# Patient Record
Sex: FEMALE | Race: OTHER | NOT HISPANIC OR LATINO
[De-identification: names, ages, dates, MRNs, and addresses within clinical notes are randomized per-mention and may not be internally consistent; named-entity substitution may affect disease eponyms.]

---

## 2021-08-20 ENCOUNTER — TRANSCRIPTION ENCOUNTER (OUTPATIENT)
Age: 36
End: 2021-08-20

## 2021-08-22 ENCOUNTER — TRANSCRIPTION ENCOUNTER (OUTPATIENT)
Age: 36
End: 2021-08-22

## 2021-08-27 ENCOUNTER — TRANSCRIPTION ENCOUNTER (OUTPATIENT)
Age: 36
End: 2021-08-27

## 2022-01-11 ENCOUNTER — RESULT REVIEW (OUTPATIENT)
Age: 37
End: 2022-01-11

## 2023-04-27 ENCOUNTER — APPOINTMENT (OUTPATIENT)
Dept: DERMATOLOGY | Facility: CLINIC | Age: 38
End: 2023-04-27
Payer: COMMERCIAL

## 2023-04-27 VITALS — HEIGHT: 65 IN | WEIGHT: 175 LBS | BODY MASS INDEX: 29.16 KG/M2

## 2023-04-27 VITALS — BODY MASS INDEX: 29.32 KG/M2 | WEIGHT: 176 LBS | HEIGHT: 65 IN

## 2023-04-27 DIAGNOSIS — D36.10 BENIGN NEOPLASM OF PERIPHERAL NERVES AND AUTONOMIC NERVOUS SYSTEM, UNSPECIFIED: ICD-10-CM

## 2023-04-27 DIAGNOSIS — B07.9 VIRAL WART, UNSPECIFIED: ICD-10-CM

## 2023-04-27 DIAGNOSIS — K64.4 RESIDUAL HEMORRHOIDAL SKIN TAGS: ICD-10-CM

## 2023-04-27 PROBLEM — Z00.00 ENCOUNTER FOR PREVENTIVE HEALTH EXAMINATION: Status: ACTIVE | Noted: 2023-04-27

## 2023-04-27 PROCEDURE — 99203 OFFICE O/P NEW LOW 30 MIN: CPT | Mod: 25

## 2023-04-27 PROCEDURE — 17110 DESTRUCTION B9 LES UP TO 14: CPT

## 2023-04-27 NOTE — PHYSICAL EXAM
[Alert] : alert [Oriented x 3] : ~L oriented x 3 [FreeTextEntry3] : Focused exam performed. Findings notable for:\par \par Pedunculated skin toned papule on perianal region\par Soft fleshy depressible nodule on R posterior upper thigh\par Verrucous papule on R3 subungual region (fingernails with opaque polish)

## 2023-04-27 NOTE — ASSESSMENT
[FreeTextEntry1] : # Perianal skin tag\par - Benign, reassured\par - Treated with LN2 today (courtesy, no charge)\par - Discussed that future treatment would incur cosmetic fee (120)\par - No s/s of underlying IBD\par \par # Neurofibroma \par - Benign, reassured\par - Given symptoms, discussed excision\par - Reviewed R/B/E and pre op instructions\par - Pt will schedule if desired; cpt codes 38060 and [id number]\par \par # NUB - R3 subungual\par - Favor VV\par - LN2 performed per procedure note\par - Return if not resolved, may require biopsy\par \par The risks/benefits/alternatives of cryo-destruction was explained to the patient which, include but are not limited to redness, swelling, pain, blistering, scar, discoloration of skin, and recurrence. The patient expressed understanding of these risks and agreed to the procedure. # 2 lesions treated with 2 cycle of LN2. The procedure was well tolerated, without complication. We have discussed related skin care.\par

## 2023-04-27 NOTE — HISTORY OF PRESENT ILLNESS
[FreeTextEntry1] : Skin lesion - NPA [de-identified] : # Skin lesions\par On perianal region - feels that it is growing. No hx of IBD. No diarrhea, blood in stool.\par On R posterior thigh - becomes irritated\par On R3 finger - tender growth under nail

## 2023-08-16 ENCOUNTER — APPOINTMENT (OUTPATIENT)
Dept: DERMATOLOGY | Facility: CLINIC | Age: 38
End: 2023-08-16
Payer: COMMERCIAL

## 2023-08-16 PROCEDURE — 12032 INTMD RPR S/A/T/EXT 2.6-7.5: CPT

## 2023-08-16 PROCEDURE — 11402 EXC TR-EXT B9+MARG 1.1-2 CM: CPT

## 2023-08-16 NOTE — PROCEDURE
[___ cm] : [unfilled] cm [___ ml of 1% lidocaine w/ 1:100,000 epinephrine] : [unfilled] ml of 1% lidocaine with 1:100,000 epinephrine [Electrodessication] : electrodessication [4-0] : 4-0 Prolene [____ cm] : [unfilled] cm [____ days] : [unfilled] days [FreeTextEntry1] : Marla Hines MD [FreeTextEntry7] : R posterior thigh [TWNoteComboBox3] : Subcutaneous fat [TWNoteComboBox5] : Subcutaneous fat

## 2023-08-30 ENCOUNTER — APPOINTMENT (OUTPATIENT)
Dept: DERMATOLOGY | Facility: CLINIC | Age: 38
End: 2023-08-30
Payer: COMMERCIAL

## 2023-08-30 DIAGNOSIS — Z48.02 ENCOUNTER FOR REMOVAL OF SUTURES: ICD-10-CM

## 2023-08-30 PROCEDURE — 99024 POSTOP FOLLOW-UP VISIT: CPT

## 2023-08-30 NOTE — HISTORY OF PRESENT ILLNESS
[FreeTextEntry1] : Suture removal [de-identified] : Here today for suture removal Path c/w fibrolipoma No issues

## 2023-08-30 NOTE — ASSESSMENT
[FreeTextEntry1] : # Fibrolipoma s/p excision - Reviewed benign path - Sutures removed - Scar care sheet provided  FU PRN

## 2024-05-14 ENCOUNTER — APPOINTMENT (OUTPATIENT)
Dept: DERMATOLOGY | Facility: CLINIC | Age: 39
End: 2024-05-14

## 2024-05-28 ENCOUNTER — APPOINTMENT (OUTPATIENT)
Dept: PLASTIC SURGERY | Facility: CLINIC | Age: 39
End: 2024-05-28
Payer: COMMERCIAL

## 2024-05-28 VITALS
SYSTOLIC BLOOD PRESSURE: 118 MMHG | WEIGHT: 168 LBS | HEART RATE: 65 BPM | HEIGHT: 65 IN | BODY MASS INDEX: 27.99 KG/M2 | OXYGEN SATURATION: 99 % | DIASTOLIC BLOOD PRESSURE: 77 MMHG

## 2024-05-28 DIAGNOSIS — N64.81 PTOSIS OF BREAST: ICD-10-CM

## 2024-05-28 DIAGNOSIS — N62 HYPERTROPHY OF BREAST: ICD-10-CM

## 2024-05-28 PROCEDURE — 99204 OFFICE O/P NEW MOD 45 MIN: CPT

## 2024-05-28 RX ORDER — LOSARTAN POTASSIUM 25 MG/1
25 TABLET, FILM COATED ORAL
Refills: 0 | Status: ACTIVE | COMMUNITY

## 2024-05-28 RX ORDER — MUPIROCIN 20 MG/G
2 OINTMENT TOPICAL
Qty: 1 | Refills: 0 | Status: COMPLETED | COMMUNITY
Start: 2023-08-16 | End: 2024-05-28

## 2024-05-31 PROBLEM — N62 BREAST HYPERTROPHY: Status: ACTIVE | Noted: 2024-05-31

## 2024-05-31 PROBLEM — N64.81 PTOSIS OF BREAST: Status: ACTIVE | Noted: 2024-05-31

## 2024-05-31 NOTE — REVIEW OF SYSTEMS
[Shoulder Problem] : shoulder problems [Shoulder Pain] : shoulder pain [Mid Back Pain] : mid back pain [Upper Back Pain] : upper back pain

## 2024-05-31 NOTE — PHYSICAL EXAM
[NI] : Normal [de-identified] : sn n 29 l 28 r l larger by 100 + gm nl sens no mass grade 3 ptosis

## 2024-05-31 NOTE — HISTORY OF PRESENT ILLNESS
[FreeTextEntry1] :  Ms. SLIME OREILLY  is a   38 year  old female complaining of large hypertrophic breasts, causing neck, back and shoulder strain.  She also complains of headache and fatigue from constantly trying to support her spine.  Her symptoms began shortly after puberty and remain a constant source  of discomfort and social embarrassment. Her symptoms are not relieved by postural changes weight reduction or supportive measures.  She has been under the care of other practitioners for relief which is ineffectual. The patient is a DD cup and desires reduction to D cup.   The risks, benefits, alternatives limitations and the permanent scars were outlined with the patient and she is prepared for a pedicled  breast reduction with lollipop scar.  SLIME will not qualify for insurance based on volume of resection 200 gm l and 50 gm r

## 2024-05-31 NOTE — ASSESSMENT
[FreeTextEntry1] : Ms. SLIME OREILLY  is a suitable candidate for a pedicled breast reduction.  Planning medial pedicle with lollipop scar and resection of small volume r side and 100-150 gm l side for improved symmetry of size and shape SLIME does not want large reduction on r side

## 2025-02-06 ENCOUNTER — APPOINTMENT (OUTPATIENT)
Dept: SURGERY | Facility: CLINIC | Age: 40
End: 2025-02-06
Payer: COMMERCIAL

## 2025-02-06 VITALS — TEMPERATURE: 98.5 F | HEART RATE: 76 BPM | DIASTOLIC BLOOD PRESSURE: 85 MMHG | SYSTOLIC BLOOD PRESSURE: 121 MMHG

## 2025-02-06 DIAGNOSIS — M79.89 OTHER SPECIFIED SOFT TISSUE DISORDERS: ICD-10-CM

## 2025-02-06 DIAGNOSIS — Z86.79 PERSONAL HISTORY OF OTHER DISEASES OF THE CIRCULATORY SYSTEM: ICD-10-CM

## 2025-02-06 DIAGNOSIS — K42.9 UMBILICAL HERNIA W/OUT OBSTRUCTION OR GANGRENE: ICD-10-CM

## 2025-02-06 PROCEDURE — 99204 OFFICE O/P NEW MOD 45 MIN: CPT

## 2025-02-06 RX ORDER — ASCORBIC ACID 500 MG
TABLET ORAL
Refills: 0 | Status: ACTIVE | COMMUNITY

## 2025-02-06 RX ORDER — TIRZEPATIDE 5 MG/.5ML
INJECTION, SOLUTION SUBCUTANEOUS
Refills: 0 | Status: ACTIVE | COMMUNITY

## 2025-02-06 RX ORDER — CHOLECALCIFEROL (VITAMIN D3) 25 MCG
TABLET ORAL
Refills: 0 | Status: ACTIVE | COMMUNITY

## 2025-02-06 RX ORDER — GLUC/MSM/COLGN2/HYAL/ANTIARTH3 375-375-20
TABLET ORAL
Refills: 0 | Status: ACTIVE | COMMUNITY

## 2025-02-06 RX ORDER — BIOTIN 10 MG
TABLET ORAL
Refills: 0 | Status: ACTIVE | COMMUNITY

## 2025-03-24 ENCOUNTER — APPOINTMENT (OUTPATIENT)
Dept: HEART AND VASCULAR | Facility: CLINIC | Age: 40
End: 2025-03-24

## 2025-07-09 ENCOUNTER — APPOINTMENT (OUTPATIENT)
Dept: OBGYN | Facility: CLINIC | Age: 40
End: 2025-07-09

## 2025-07-09 PROCEDURE — 76830 TRANSVAGINAL US NON-OB: CPT | Mod: 26
